# Patient Record
Sex: FEMALE | Race: BLACK OR AFRICAN AMERICAN | URBAN - METROPOLITAN AREA
[De-identification: names, ages, dates, MRNs, and addresses within clinical notes are randomized per-mention and may not be internally consistent; named-entity substitution may affect disease eponyms.]

---

## 2018-04-10 ENCOUNTER — EMERGENCY (EMERGENCY)
Facility: HOSPITAL | Age: 18
LOS: 0 days | Discharge: HOME | End: 2018-04-10
Attending: EMERGENCY MEDICINE

## 2018-04-10 VITALS
DIASTOLIC BLOOD PRESSURE: 63 MMHG | SYSTOLIC BLOOD PRESSURE: 100 MMHG | TEMPERATURE: 99 F | RESPIRATION RATE: 18 BRPM | HEART RATE: 94 BPM | OXYGEN SATURATION: 100 %

## 2018-04-10 DIAGNOSIS — N39.0 URINARY TRACT INFECTION, SITE NOT SPECIFIED: ICD-10-CM

## 2018-04-10 DIAGNOSIS — N83.209 UNSPECIFIED OVARIAN CYST, UNSPECIFIED SIDE: ICD-10-CM

## 2018-04-10 DIAGNOSIS — K92.1 MELENA: ICD-10-CM

## 2018-04-10 RX ORDER — NITROFURANTOIN MACROCRYSTAL 50 MG
1 CAPSULE ORAL
Qty: 14 | Refills: 0 | OUTPATIENT
Start: 2018-04-10 | End: 2018-04-16

## 2018-04-10 NOTE — ED PROVIDER NOTE - NS ED ROS FT
Constitutional: See HPI.  Cardiac: No chest pain, SOB or edema. No chest pain with exertion.   Respiratory: No cough or respiratory distress.   GI: +bloody diarrhea; No nausea, vomiting, or abdominal pain.  : +dysuria; No frequency or burning.  MS: No myalgia, muscle weakness, joint pain or back pain.  Neuro: No headache or weakness. No LOC.  Skin: No skin rash.

## 2018-04-10 NOTE — ED PROVIDER NOTE - PROGRESS NOTE DETAILS
Spoke to attending working at [facility], informed us patient was in [facility] ED for abdominal pain, had WBC 14, Hb 13.5, normal BMP, normal lipase, normal CT abdomen/pelvis, non diagnostic US due to uncooperative patient, + UTI, given 1 dose of ceftriaxone, placed in observation unit for intractable abdominal pain, patient AMA from [facility] earlier today.

## 2018-04-10 NOTE — ED PROVIDER NOTE - PHYSICAL EXAMINATION
CONSTITUTIONAL: Well-developed; well-nourished; in no acute distress.   SKIN: warm, dry  CARD: S1, S2 normal; no murmurs, gallops, or rubs. Regular rate and rhythm.   RESP: No wheezes, rales or rhonchi.  ABD: patient refused examine, unable to evaluate  NEURO: Alert, oriented, grossly unremarkable  PSYCH: Cooperative, appropriate.

## 2018-04-10 NOTE — ED PROVIDER NOTE - OBJECTIVE STATEMENT
17 y/o female with pmhx of ovarian cyst presents with bloody stools and dysuria. Patient states that she went to [facility], was evaluated for these symptoms, however had to wait for 20 hours to get a bed so left and came to [facility]. Patient states she has had bright red blood in stools for past 3 days, as well as dysuria. Patient denies fevers/chills, SOB, chest pain, N/V, urinary frequency. LMP 17 y/o female with pmhx of ovarian cyst presents with bloody stools and dysuria. Patient states that she went to [facility], was evaluated for these symptoms, however had to wait for 20 hours to get a bed so left and came to [facility]. Patient states she has had bright red blood in stools for past 3 days, as well as dysuria. Patient denies fevers/chills, SOB, chest pain, N/V, urinary frequency. LMP 04/04, denies abnormal vaginal bleeding, discharge, foul smell.

## 2018-04-10 NOTE — ED PROVIDER NOTE - PLAN OF CARE
17 y/o female with pmhx of ovarian cyst presents with abdominal pain, diagnosed with UTI at [facility], will send abx to pharmacy.

## 2018-04-10 NOTE — ED PROVIDER NOTE - CARE PLAN
Principal Discharge DX:	UTI (urinary tract infection)  Assessment and plan of treatment:	17 y/o female with pmhx of ovarian cyst presents with abdominal pain, diagnosed with UTI at [facility], will send abx to pharmacy.  Secondary Diagnosis:	Ovarian cyst

## 2018-04-10 NOTE — ED PEDIATRIC NURSE NOTE - OBJECTIVE STATEMENT
pt c.o. bloody diarrhea x 3 days. pt left [facility] after being admitted because "of a uti but she didn't get a bed quick enough so she came here"

## 2018-04-10 NOTE — ED PROVIDER NOTE - ATTENDING CONTRIBUTION TO CARE
18yr no sig pmh with blood stool and dysuria for three days diffuse abdominal pain diarrhea for three days bloody no frequency no hesitancy pt went to [facility] AMA an hour before coming pt waited for a bed for a long time. pt had blood work and CT and US 18yr no sig pmh with blood stool and dysuria for three days diffuse abdominal pain diarrhea for three days bloody no frequency no hesitancy pt went to [facility] AMA an hour before coming pt waited for a bed for a long time. pt had blood work and CT and US at [facility] per [facility] ED pt CT is annie wbc was 13 pt + UTI received ceftriaxoned and pt AMA. hgb was stable   plan will check guiac

## 2019-09-05 ENCOUNTER — EMERGENCY (EMERGENCY)
Facility: HOSPITAL | Age: 19
LOS: 0 days | Discharge: HOME | End: 2019-09-05
Attending: EMERGENCY MEDICINE | Admitting: EMERGENCY MEDICINE
Payer: SUBSIDIZED

## 2019-09-05 VITALS
OXYGEN SATURATION: 100 % | TEMPERATURE: 98 F | HEART RATE: 70 BPM | DIASTOLIC BLOOD PRESSURE: 61 MMHG | SYSTOLIC BLOOD PRESSURE: 100 MMHG | RESPIRATION RATE: 18 BRPM

## 2019-09-05 DIAGNOSIS — R10.13 EPIGASTRIC PAIN: ICD-10-CM

## 2019-09-05 DIAGNOSIS — Z88.8 ALLERGY STATUS TO OTHER DRUGS, MEDICAMENTS AND BIOLOGICAL SUBSTANCES STATUS: ICD-10-CM

## 2019-09-05 PROBLEM — N83.209 UNSPECIFIED OVARIAN CYST, UNSPECIFIED SIDE: Chronic | Status: ACTIVE | Noted: 2018-04-10

## 2019-09-05 LAB
ALBUMIN SERPL ELPH-MCNC: 4.2 G/DL — SIGNIFICANT CHANGE UP (ref 3.5–5.2)
ALP SERPL-CCNC: 51 U/L — SIGNIFICANT CHANGE UP (ref 30–115)
ALT FLD-CCNC: 10 U/L — SIGNIFICANT CHANGE UP (ref 0–41)
ANION GAP SERPL CALC-SCNC: 11 MMOL/L — SIGNIFICANT CHANGE UP (ref 7–14)
APPEARANCE UR: CLEAR — SIGNIFICANT CHANGE UP
AST SERPL-CCNC: 19 U/L — SIGNIFICANT CHANGE UP (ref 0–41)
BACTERIA # UR AUTO: ABNORMAL
BASOPHILS # BLD AUTO: 0.04 K/UL — SIGNIFICANT CHANGE UP (ref 0–0.2)
BASOPHILS NFR BLD AUTO: 0.6 % — SIGNIFICANT CHANGE UP (ref 0–1)
BILIRUB SERPL-MCNC: 0.3 MG/DL — SIGNIFICANT CHANGE UP (ref 0.2–1.2)
BILIRUB UR-MCNC: NEGATIVE — SIGNIFICANT CHANGE UP
BUN SERPL-MCNC: 9 MG/DL — LOW (ref 10–20)
CALCIUM SERPL-MCNC: 9.5 MG/DL — SIGNIFICANT CHANGE UP (ref 8.5–10.1)
CHLORIDE SERPL-SCNC: 101 MMOL/L — SIGNIFICANT CHANGE UP (ref 98–110)
CO2 SERPL-SCNC: 26 MMOL/L — SIGNIFICANT CHANGE UP (ref 17–32)
COLOR SPEC: YELLOW — SIGNIFICANT CHANGE UP
CREAT SERPL-MCNC: 0.8 MG/DL — SIGNIFICANT CHANGE UP (ref 0.7–1.5)
DIFF PNL FLD: NEGATIVE — SIGNIFICANT CHANGE UP
EOSINOPHIL # BLD AUTO: 0.02 K/UL — SIGNIFICANT CHANGE UP (ref 0–0.7)
EOSINOPHIL NFR BLD AUTO: 0.3 % — SIGNIFICANT CHANGE UP (ref 0–8)
EPI CELLS # UR: 9 /HPF — HIGH (ref 0–5)
GLUCOSE SERPL-MCNC: 91 MG/DL — SIGNIFICANT CHANGE UP (ref 70–99)
GLUCOSE UR QL: NEGATIVE — SIGNIFICANT CHANGE UP
HCT VFR BLD CALC: 33.2 % — LOW (ref 37–47)
HGB BLD-MCNC: 10.6 G/DL — LOW (ref 12–16)
HYALINE CASTS # UR AUTO: 7 /LPF — SIGNIFICANT CHANGE UP (ref 0–7)
IMM GRANULOCYTES NFR BLD AUTO: 0.2 % — SIGNIFICANT CHANGE UP (ref 0.1–0.3)
KETONES UR-MCNC: SIGNIFICANT CHANGE UP
LEUKOCYTE ESTERASE UR-ACNC: ABNORMAL
LYMPHOCYTES # BLD AUTO: 2.74 K/UL — SIGNIFICANT CHANGE UP (ref 1.2–3.4)
LYMPHOCYTES # BLD AUTO: 43.5 % — SIGNIFICANT CHANGE UP (ref 20.5–51.1)
MCHC RBC-ENTMCNC: 23.7 PG — LOW (ref 27–31)
MCHC RBC-ENTMCNC: 31.9 G/DL — LOW (ref 32–37)
MCV RBC AUTO: 74.3 FL — LOW (ref 81–99)
MONOCYTES # BLD AUTO: 0.54 K/UL — SIGNIFICANT CHANGE UP (ref 0.1–0.6)
MONOCYTES NFR BLD AUTO: 8.6 % — SIGNIFICANT CHANGE UP (ref 1.7–9.3)
NEUTROPHILS # BLD AUTO: 2.95 K/UL — SIGNIFICANT CHANGE UP (ref 1.4–6.5)
NEUTROPHILS NFR BLD AUTO: 46.8 % — SIGNIFICANT CHANGE UP (ref 42.2–75.2)
NITRITE UR-MCNC: NEGATIVE — SIGNIFICANT CHANGE UP
NRBC # BLD: 0 /100 WBCS — SIGNIFICANT CHANGE UP (ref 0–0)
PH UR: 6 — SIGNIFICANT CHANGE UP (ref 5–8)
PLATELET # BLD AUTO: 275 K/UL — SIGNIFICANT CHANGE UP (ref 130–400)
POTASSIUM SERPL-MCNC: 4.2 MMOL/L — SIGNIFICANT CHANGE UP (ref 3.5–5)
POTASSIUM SERPL-SCNC: 4.2 MMOL/L — SIGNIFICANT CHANGE UP (ref 3.5–5)
PROT SERPL-MCNC: 7.3 G/DL — SIGNIFICANT CHANGE UP (ref 6–8)
PROT UR-MCNC: ABNORMAL
RBC # BLD: 4.47 M/UL — SIGNIFICANT CHANGE UP (ref 4.2–5.4)
RBC # FLD: 17.1 % — HIGH (ref 11.5–14.5)
RBC CASTS # UR COMP ASSIST: 6 /HPF — HIGH (ref 0–4)
SODIUM SERPL-SCNC: 138 MMOL/L — SIGNIFICANT CHANGE UP (ref 135–146)
SP GR SPEC: 1.03 — HIGH (ref 1.01–1.02)
UROBILINOGEN FLD QL: ABNORMAL
WBC # BLD: 6.3 K/UL — SIGNIFICANT CHANGE UP (ref 4.8–10.8)
WBC # FLD AUTO: 6.3 K/UL — SIGNIFICANT CHANGE UP (ref 4.8–10.8)
WBC UR QL: 10 /HPF — HIGH (ref 0–5)

## 2019-09-05 PROCEDURE — 76856 US EXAM PELVIC COMPLETE: CPT | Mod: 26

## 2019-09-05 PROCEDURE — 99285 EMERGENCY DEPT VISIT HI MDM: CPT

## 2019-09-05 RX ORDER — MORPHINE SULFATE 50 MG/1
2 CAPSULE, EXTENDED RELEASE ORAL ONCE
Refills: 0 | Status: DISCONTINUED | OUTPATIENT
Start: 2019-09-05 | End: 2019-09-05

## 2019-09-05 RX ADMIN — MORPHINE SULFATE 2 MILLIGRAM(S): 50 CAPSULE, EXTENDED RELEASE ORAL at 16:35

## 2019-09-05 NOTE — ED PROVIDER NOTE - PROGRESS NOTE DETAILS
Patient continues to be in pain; just returned from US. WIll order morphine, as tylenol not improving pain and allergy to  motrin. Discussed lab work w patient. Improved pain with administration of morphine. Discussed unremarkable US findings w patient and counselled patient on need for outpatient GI follow up for further intervention testing. She agreed with the plan and remains appropriate for discharge. Provided patient with note for work.

## 2019-09-05 NOTE — ED PROVIDER NOTE - CLINICAL SUMMARY MEDICAL DECISION MAKING FREE TEXT BOX
Patient presented with lower abdominal pain, non-tender on exam and otherwise afebrile, HD stable, well appearing. Had CT abd/pelvis and RUQ US done at [facility] which was negative. Obtained labs which were grossly unremarkable. UA did not show evidence of infection, and pelvic US grossly negative. Pain otherwise controlled in ED, and repeat abdominal exam remained benign. Will discharge home with outpatient follow up. Patient agreeable with plan. Ambulatory, tolerates PO. Agrees to return to ED for any new or worsening symptoms.

## 2019-09-05 NOTE — ED PROVIDER NOTE - PHYSICAL EXAMINATION
Vital Signs: I have reviewed the initial vital signs.  Constitutional: NAD, appears stated age.  HEENT: Airway patent, moist MM. EOMI, PERRLA.  CV: regular rate, regular rhythm, well-perfused extremities.  Lungs: BCTA, no increased WOB. No WRR.  ABD: tender epigastric region and pubic region; soft, no guarding or rebound, no pulsatile mass, no hernias.    MSK: Neck supple, nontender, nl ROM, no stepoff. Chest nontender. Back nontender. No flank tenderness BL. Ext nontender, nl rom, no deformity.   INTEG: Skin warm, dry, no rash.  NEURO: A&O, normal speech.   PSYCH: Calm, cooperative, normal affect and interaction.

## 2019-09-05 NOTE — ED PROVIDER NOTE - ATTENDING CONTRIBUTION TO CARE
19 year old female, pmhx ovarian cyst with rupture 3 years ago, otherwise no pmhx, presenting with bilateral lower quadrant abdominal pain x 1 week described as constant, sharp, originating in suprapubic region and radiating upwards to epigastric region, 10/10 at its worst, with no palliative or provocative factors. Patient was seen at [facility] earlier this week at which time a RUQ US and CT abd/pelvis were done and negative. She was dx with UTI and d/c on abx but has not been taking them. Otherwise denies fevers, headache, vision changes, weakness/numbness, confusion, URI symptoms, neck pain, chest pain, back pain, dyspnea, cough, palpitations, nausea, vomiting, diarrhea, constipation, blood in stool/dark stools, urinary symptoms, vaginal bleeding/discharge, leg swelling, rash, recent travel or sick contacts.    Vital Signs: I have reviewed the initial vital signs.  Constitutional: NAD, well-nourished, appears stated age, no acute distress.  HEENT: Airway patent, moist MM, no erythema/swelling/deformity of oral structures. EOMI, PERRLA.  CV: regular rate, regular rhythm, well-perfused extremities, 2+ b/l DP and radial pulses equal.  Lungs: BCTA, no increased WOB.  ABD: NTND, no guarding or rebound, no pulsatile mass, no hernias.   MSK: Neck supple, nontender, nl ROM, no stepoff. Chest nontender. Back nontender in TLS spine or to b/l bony structures or flanks. Ext nontender, nl rom, no deformity.   INTEG: Skin warm, dry, no rash.  NEURO: A&Ox3, normal strength, nl sensation throughout, normal speech.   PSYCH: Calm, cooperative, normal affect and interaction.    Abd non-tender. Patient already had CT abd/pelvis and RUQ US that was negative at [facility]. Will repeat labs, UA, get pelvic US, pain control, re-eval.

## 2019-09-05 NOTE — ED PROVIDER NOTE - PATIENT PORTAL LINK FT
You can access the FollowMyHealth Patient Portal offered by [facility] by registering at the following website: [email or web address]. By joining [employer]’s FollowMyHealth portal, you will also be able to view your health information using other applications (apps) compatible with our system.

## 2019-09-05 NOTE — ED PROVIDER NOTE - OBJECTIVE STATEMENT
19 y o F w PMH ovarian cyst/rupture 3y ago presenting to the ED with abdominal pain for the past week. Patient states pain is constant, 10/10 sharp in nature, originates near pubic area and radiates up to epigastric region, worse with walking and deep inspiration; only mildly relieved with Tylenol. States she was seen at [facility] earlier in the week where she had a RUQ US and CT abdomen/pelvis which were unremarkable; was diagnosed with UTI and d/c on abx but has not been taking them due to hx of GI upset. LMP Aug. 6; denies any discharge.  Denies any NVD, dysuria, polyuria, hematuria, CP, SOB, palpitations. 19 y o F w PMH ovarian cyst/rupture 3y ago presenting to the ED with abdominal pain for the past week. Patient states pain is constant, 10/10 sharp in nature, originates near pubic area and radiates up to epigastric region, worse with walking and deep inspiration; only mildly relieved with Tylenol. States she was seen at [facility] earlier in the week where she had a RUQ US and CT abdomen/pelvis which were unremarkable; was diagnosed with UTI and d/c on abx but has not been taking them due to hx of GI upset. LMP Aug. 6; denies any discharge; no hx STI.  Denies any NVD, dysuria, polyuria, hematuria, CP, SOB, palpitations.

## 2019-09-05 NOTE — ED PROVIDER NOTE - CARE PROVIDER_API CALL
Sharon Lindsay)  Internal Medicine  [address] [place] [address]  Phone: [phone]  Fax: [phone]  Follow Up Time: 7-10 Days

## 2019-09-05 NOTE — ED PROVIDER NOTE - NSFOLLOWUPINSTRUCTIONS_ED_ALL_ED_FT
Abdominal Pain    Many things can cause abdominal pain. Many times, abdominal pain is not caused by a disease and will improve without treatment. Your health care provider will do a physical exam to determine if there is a dangerous cause of your pain; blood tests and imaging may help determine the cause of your pain. However, in many cases, no cause may be found and you may need further testing as an outpatient. Monitor your abdominal pain for any changes. Please follow up with a gastroenterologist for further management of these symptoms; we've included a physician for follow up below.     SEEK IMMEDIATE MEDICAL CARE IF YOU HAVE ANY OF THE FOLLOWING SYMPTOMS: worsening abdominal pain, uncontrollable vomiting, profuse diarrhea, inability to have bowel movements or pass gas, black or bloody stools, fever accompanying chest pain or back pain, or fainting. These symptoms may represent a serious problem that is an emergency. Do not wait to see if the symptoms will go away. Get medical help right away. Call 911 and do not drive yourself to the hospital.

## 2019-09-05 NOTE — ED PROVIDER NOTE - NS ED ROS FT
Constitutional: (-) fever, (-) chills  Eyes/ENT: (-) blurry vision, (-) sore throat  Cardiovascular: (-) chest pain, (-) syncope  Respiratory: (-) cough, (-) shortness of breath  Gastrointestinal: (+) abdominal pain, (-) vomiting, (-) diarrhea  Musculoskeletal: (-) neck pain, (-) back pain, (-) joint pain  Integumentary: (-) rash, (-) edema  Neurological: (-) headache, (-) altered mental status  : (-) dysuria, hematuria.  Allergic/Immunologic: (-) pruritus, rash

## 2019-09-05 NOTE — ED ADULT NURSE NOTE - OBJECTIVE STATEMENT
pt c/o upper abd pain for few days. went to [facility] and diagnosed with uti pt still c/o pain

## 2019-12-16 ENCOUNTER — EMERGENCY (EMERGENCY)
Facility: HOSPITAL | Age: 19
LOS: 0 days | Discharge: HOME | End: 2019-12-16
Attending: EMERGENCY MEDICINE | Admitting: EMERGENCY MEDICINE
Payer: MEDICAID

## 2019-12-16 VITALS
RESPIRATION RATE: 16 BRPM | TEMPERATURE: 98 F | SYSTOLIC BLOOD PRESSURE: 105 MMHG | HEIGHT: 67 IN | DIASTOLIC BLOOD PRESSURE: 58 MMHG | HEART RATE: 61 BPM | OXYGEN SATURATION: 99 % | WEIGHT: 125 LBS

## 2019-12-16 DIAGNOSIS — K08.89 OTHER SPECIFIED DISORDERS OF TEETH AND SUPPORTING STRUCTURES: ICD-10-CM

## 2019-12-16 PROCEDURE — 99282 EMERGENCY DEPT VISIT SF MDM: CPT

## 2019-12-16 NOTE — ED PROVIDER NOTE - NS ED ROS FT
Constitutional: No fever, chills.  Eyes: No visual changes.  Ent: +tooth pain  Cardiovascular: No chest pain, palpitations.  Pulmonary: No SOB, cough.  Neuro: No headache, syncope.  MS: No back pain.

## 2019-12-16 NOTE — ED PROVIDER NOTE - PHYSICAL EXAMINATION
Constitutional: Well developed, well nourished. NAD.  Head: Normocephalic, atraumatic.  Eyes: PERRL.  ENT: Mucous membranes moist. +cracked L upper tooth  Cardiovascular: Normal S1, S2. Regular rate and rhythm. No murmurs, rubs, or gallops.  Pulmonary: Normal respiratory rate and effort. Lungs clear to auscultation bilaterally. No wheezing, rales, or rhonchi.  Neuro: AAOx3. No focal neurological deficits.  Psych: Normal mood. Normal affect.

## 2019-12-16 NOTE — ED PROVIDER NOTE - OBJECTIVE STATEMENT
pt is a 19 yof w/ left upper dental pain for 1 week, resulting in dec po intake for 1 day. denies fever, chills, sob, difficulty breating

## 2019-12-16 NOTE — CONSULT NOTE ADULT - ASSESSMENT
*ASSESSMENT: 20yo Female patient presents with grossly decayed/fractured #14     *PLAN: Surgical extraction fractured #14 under local anesthesia     PROCEDURE:   1. Reviewd Medical History  2. Premedication: None  3. Treatment:  - Risks and benefits discussed as per oral surgey sheet dated 7/13/2000. Consent and side site were signed. Administered 2 carpules of 4% Septocaine, 1:100,000 epinephrine via buccal and lingual infiltration.   - Surgical extraction number 14 completed, without complications. Gelfoam placed. One 3.0 chromic gut suture placed. Hemostasis obtained.   - Post op radiograph taken. No findings on post-extraction radiograph  4. No complications  5. Next visit: follow-up with dentist     Prescriptions:  - Amoxicillin 500mg PO TID for 7 days     RECOMMENDATIONS:  1) Finish antibiotics. Pain management as needed  2) Dental F/U with outpatient dentist for comprehensive dental care.   3) If any difficulty swallowing/breathing, fever occur, return to ER.     Teodoro Tamez

## 2019-12-16 NOTE — ED PROVIDER NOTE - NSFOLLOWUPINSTRUCTIONS_ED_ALL_ED_FT
Toothache    WHAT YOU NEED TO KNOW:    A toothache is pain that is caused by irritation of the nerves in the center of your tooth. The irritation may be caused by several problems, such as a cavity, an infection, a cracked tooth, or gum disease. Tooth Anatomy         DISCHARGE INSTRUCTIONS:    Return to the emergency department if:     You have trouble breathing or swallowing.       You have swelling in your face or neck.     Contact your dentist if:     You have a fever and chills.       You have trouble opening or closing your mouth.       You have swelling around your tooth.       You have questions or concerns about your condition or care.    Medicines: You may need any of the following:     NSAIDs, such as ibuprofen, help decrease swelling, pain, and fever. This medicine is available with or without a doctor's order. NSAIDs can cause stomach bleeding or kidney problems in certain people. If you take blood thinner medicine, always ask if NSAIDs are safe for you. Always read the medicine label and follow directions. Do not give these medicines to children under 6 months of age without direction from your child's healthcare provider.      Acetaminophen decreases pain and fever. It is available without a doctor's order. Ask how much to take and how often to take it. Follow directions. Acetaminophen can cause liver damage if not taken correctly.      Prescription pain medicine may be given. Ask your healthcare provider how to take this medicine safely. Some prescription pain medicines contain acetaminophen. Do not take other medicines that contain acetaminophen without talking to your healthcare provider. Too much acetaminophen may cause liver damage. Prescription pain medicine may cause constipation. Ask your healthcare provider how to prevent or treat constipation.       Antibiotics help treat or prevent a bacterial infection.       Take your medicine as directed. Contact your healthcare provider if you think your medicine is not helping or if you have side effects. Tell him of her if you are allergic to any medicine. Keep a list of the medicines, vitamins, and herbs you take. Include the amounts, and when and why you take them. Bring the list or the pill bottles to follow-up visits. Carry your medicine list with you in case of an emergency.    Self-care:     Rinse your mouth with warm salt water 4 times a day or as directed.       Eat soft foods to help relieve pain caused by chewing.       Apply ice on your jaw or cheek for 15 to 20 minutes every hour or as directed. Use an ice pack, or put crushed ice in a plastic bag. Cover it with a towel before you apply it. Ice helps prevent tissue damage and decreases swelling and pain.    Help prevent a toothache:     Brush your teeth at least 2 times a day.      Use dental floss to clean between your teeth at least 1 time a day.      See your dentist regularly every 6 months for dental cleanings and oral exams.    Follow up with your dentist as directed: You may be referred to a dental surgeon. Write down your questions so you remember to ask them during your visits.        © Copyright [employer] 2019 All illustrations and images included in CareNotes are the copyrighted property of [employer].A[employer]M., Inc[employer] or [employer].

## 2019-12-16 NOTE — ED ADULT TRIAGE NOTE - CHIEF COMPLAINT QUOTE
"my tooth it hurts real bad. I cracked it last year but it didn't start hurting until now" left upper tooth pain

## 2019-12-16 NOTE — CONSULT NOTE ADULT - SUBJECTIVE AND OBJECTIVE BOX
Patient is a 19y old  Female who presents with a chief complaint of pain from fractured #14    HPI: #14 fractured over 1 year ago when patient was eating. No symptoms reported until 1 month ago. Symptoms have gradually worsened. Patient was unable to sleep last night     PAST MEDICAL & SURGICAL HISTORY:  Ovarian cyst  No significant past surgical history  ( - ) heart valve replacement  ( - ) joint replacement  ( - ) pregnancy    MEDICATIONS  (STANDING):  MEDICATIONS  (PRN)    Allergies: Motrin (Unknown)  Intolerances    FAMILY HISTORY: unknown  *SOCIAL HISTORY: ( -  ) Tobacco; ( -  ) ETOH  *Last Dental Visit: Over 1 year ago    Vital Signs Last 24 Hrs  T(C): 36.9 (16 Dec 2019 11:19), Max: 36.9 (16 Dec 2019 11:19)  T(F): 98.5 (16 Dec 2019 11:19), Max: 98.5 (16 Dec 2019 11:19)  HR: 61 (16 Dec 2019 11:19) (61 - 61)  BP: 105/58 (16 Dec 2019 11:19) (105/58 - 105/58)  BP(mean): --  RR: 16 (16 Dec 2019 11:19) (16 - 16)  SpO2: 99% (16 Dec 2019 11:19) (99% - 99%)    Dental Clinic Vitals"  BP: 116/60  Pulse: 62  Temperature: 98.3    LABS:    EOE: AAOX3. No swelling, erythema or lymphadenopathy.   IOE:  Grossly decayed #14 with buccal fracture below the gumline, is sensitive to percussion and palpation    *DENTAL RADIOGRAPHS: 1 periapical. Grossly decayed #14  RADIOLOGY & ADDITIONAL STUDIES:

## 2019-12-16 NOTE — ED PROVIDER NOTE - ATTENDING CONTRIBUTION TO CARE
19 year old female, no pmhx, presenting with left upper tooth pain x 1 week. States pain is sharp, worse with eating, non-radiating, no palliative factors, 10/10 at its worst. Denies trauma and otherwise denies fevers, dysphagia, dyspnea, or any other symptoms.    Vital Signs: I have reviewed the initial vital signs.  Constitutional: NAD, well-nourished, appears stated age, no acute distress.  HEENT: Airway patent, moist MM, no erythema/swelling/deformity of oral structures. EOMI, PERRLA. (+) fractured tooth #14, no evidence of infection  CV: regular rate, regular rhythm, well-perfused extremities, 2+ b/l DP and radial pulses equal.  Lungs: BCTA, no increased WOB.  ABD: NTND, no guarding or rebound, no pulsatile mass, no hernias.   MSK: Neck supple, nontender, nl ROM, no stepoff. Chest nontender. Back nontender in TLS spine or to b/l bony structures or flanks. Ext nontender, nl rom, no deformity.   INTEG: Skin warm, dry, no rash.  NEURO: A&Ox3, normal strength, nl sensation throughout, normal speech.   PSYCH: Calm, cooperative, normal affect and interaction.    Likely 2/2 fractured tooth, otherwise airway patent, no signs of infection. Will refer to dental clinic for completion of management.

## 2019-12-16 NOTE — ED PROVIDER NOTE - CLINICAL SUMMARY MEDICAL DECISION MAKING FREE TEXT BOX
Patient presented with left tooth pain, (+) fx of tooth #14 seen on exam without evidence of infection, airway otherwise patent, no drooling or trismus, speaking full sentences. Afebrile, Hd stable. Will refer to dental clinic for further management. Patient agrees with plan. Agrees to return to ED for any new or worsening symptoms.

## 2020-04-26 ENCOUNTER — MESSAGE (OUTPATIENT)
Age: 20
End: 2020-04-26

## 2022-11-01 PROBLEM — Z00.00 ENCOUNTER FOR PREVENTIVE HEALTH EXAMINATION: Status: ACTIVE | Noted: 2022-11-01

## 2022-11-08 ENCOUNTER — APPOINTMENT (OUTPATIENT)
Dept: OBGYN | Facility: CLINIC | Age: 22
End: 2022-11-08

## 2023-10-12 ENCOUNTER — APPOINTMENT (OUTPATIENT)
Dept: OBGYN | Facility: CLINIC | Age: 23
End: 2023-10-12

## 2023-11-06 ENCOUNTER — APPOINTMENT (OUTPATIENT)
Age: 23
End: 2023-11-06

## 2024-03-05 ENCOUNTER — OUTPATIENT (OUTPATIENT)
Dept: OUTPATIENT SERVICES | Facility: HOSPITAL | Age: 24
LOS: 1 days | End: 2024-03-05
Payer: MEDICAID

## 2024-03-05 DIAGNOSIS — Z01.20 ENCOUNTER FOR DENTAL EXAMINATION AND CLEANING WITHOUT ABNORMAL FINDINGS: ICD-10-CM

## 2024-03-05 PROCEDURE — D0120: CPT

## 2024-03-06 DIAGNOSIS — Z01.21 ENCOUNTER FOR DENTAL EXAMINATION AND CLEANING WITH ABNORMAL FINDINGS: ICD-10-CM
